# Patient Record
Sex: MALE | Race: WHITE | NOT HISPANIC OR LATINO | URBAN - METROPOLITAN AREA
[De-identification: names, ages, dates, MRNs, and addresses within clinical notes are randomized per-mention and may not be internally consistent; named-entity substitution may affect disease eponyms.]

---

## 2017-06-14 ENCOUNTER — EMERGENCY (EMERGENCY)
Facility: HOSPITAL | Age: 62
LOS: 1 days | Discharge: HOME | End: 2017-06-14
Attending: EMERGENCY MEDICINE | Admitting: EMERGENCY MEDICINE
Payer: COMMERCIAL

## 2017-06-14 VITALS
RESPIRATION RATE: 15 BRPM | HEART RATE: 91 BPM | SYSTOLIC BLOOD PRESSURE: 152 MMHG | TEMPERATURE: 98 F | OXYGEN SATURATION: 100 % | DIASTOLIC BLOOD PRESSURE: 79 MMHG

## 2017-06-14 VITALS
HEART RATE: 94 BPM | SYSTOLIC BLOOD PRESSURE: 90 MMHG | OXYGEN SATURATION: 97 % | HEIGHT: 71 IN | DIASTOLIC BLOOD PRESSURE: 50 MMHG | TEMPERATURE: 98 F | RESPIRATION RATE: 18 BRPM | WEIGHT: 166.89 LBS
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
## 2017-06-14 DIAGNOSIS — S41.112A LACERATION WITHOUT FOREIGN BODY OF LEFT UPPER ARM, INITIAL ENCOUNTER: ICD-10-CM

## 2017-06-14 DIAGNOSIS — I10 ESSENTIAL (PRIMARY) HYPERTENSION: ICD-10-CM
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
## 2017-06-14 PROCEDURE — 96365 THER/PROPH/DIAG IV INF INIT: CPT | Mod: 59

## 2017-06-14 PROCEDURE — 13122 CMPLX RPR S/A/L ADDL 5 CM/>: CPT | Mod: LT

## 2017-06-14 PROCEDURE — 99285 EMERGENCY DEPT VISIT HI MDM: CPT | Mod: 25

## 2017-06-14 PROCEDURE — 13121 CMPLX RPR S/A/L 2.6-7.5 CM: CPT | Mod: LT

## 2017-06-14 PROCEDURE — 73060 X-RAY EXAM OF HUMERUS: CPT

## 2017-06-14 PROCEDURE — 73060 X-RAY EXAM OF HUMERUS: CPT | Mod: 26,LT

## 2017-06-14 PROCEDURE — 99284 EMERGENCY DEPT VISIT MOD MDM: CPT | Mod: 25

## 2017-06-14 PROCEDURE — 13121 CMPLX RPR S/A/L 2.6-7.5 CM: CPT

## 2017-06-14 PROCEDURE — 13122 CMPLX RPR S/A/L ADDL 5 CM/>: CPT

## 2017-06-14 RX ORDER — AMPICILLIN SODIUM AND SULBACTAM SODIUM 250; 125 MG/ML; MG/ML
3 INJECTION, POWDER, FOR SUSPENSION INTRAMUSCULAR; INTRAVENOUS ONCE
Qty: 0 | Refills: 0 | Status: COMPLETED | OUTPATIENT
Start: 2017-06-14 | End: 2017-06-14

## 2017-06-14 RX ORDER — SODIUM CHLORIDE 9 MG/ML
1000 INJECTION INTRAMUSCULAR; INTRAVENOUS; SUBCUTANEOUS ONCE
Qty: 0 | Refills: 0 | Status: COMPLETED | OUTPATIENT
Start: 2017-06-14 | End: 2017-06-14

## 2017-06-14 RX ADMIN — SODIUM CHLORIDE 1000 MILLILITER(S): 9 INJECTION INTRAMUSCULAR; INTRAVENOUS; SUBCUTANEOUS at 19:18

## 2017-06-14 RX ADMIN — AMPICILLIN SODIUM AND SULBACTAM SODIUM 200 GRAM(S): 250; 125 INJECTION, POWDER, FOR SUSPENSION INTRAMUSCULAR; INTRAVENOUS at 19:17

## 2017-06-14 NOTE — ED ADULT NURSE REASSESSMENT NOTE - NS ED NURSE REASSESS COMMENT FT1
Received patient from Myrtle CHAVEZ. Patient alert and oriented. Patient complaining of moderate pain left arm from laceration. Patient vital signs as documented. Awaiting plastics consult for laceration repair. Patient and family aware of plan. Safety maintained.

## 2017-06-14 NOTE — ED PROVIDER NOTE - OBJECTIVE STATEMENT
pt bib ems for left arm laceration s/p missed bottom step when climbing down ladder, stumbled back through large glass window. no loc, ha, neck or back pain, weakness, numbness, cp, sob, abd pain. tetanus utd  pmd - andrea

## 2017-06-14 NOTE — CONSULT NOTE ADULT - SUBJECTIVE AND OBJECTIVE BOX
see dictated note.  consented.  tolerated repair of left arm lac.  augmentin/percocet for 10 days.  keep splinted, c/d/e.  f/u next wk.

## 2017-06-14 NOTE — ED PROVIDER NOTE - PROGRESS NOTE DETAILS
pt seen eval sutured and splinted by dr ramires (plastics), he requests d/c with augmentin and percocet and f/u as outpt

## 2017-06-14 NOTE — ED PROVIDER NOTE - SKIN, MLM
Skin normal color for race, warm, dry. approx cm laceration to left upper arm with exposed muscle, no active bleeding Skin normal color for race, warm, dry. approx 8 cm laceration to left upper arm with exposed muscle, no active bleeding Skin normal color for race, warm, dry. approx 8 cm laceration to left upper arm with exposed muscle, no active bleeding. after wound explored, tricep muscle laceration visualized Skin normal color for race, warm, dry. approx 10 cm laceration to left upper arm with exposed muscle, no active bleeding. after wound explored, tricep muscle laceration visualized

## 2017-06-14 NOTE — ED ADULT NURSE NOTE - OBJECTIVE STATEMENT
Pt to ED s/p fall onto glass resulting in laceration to left anti-cubital area, EMS placed tourniquet/ace to minimize bleeding Pt to ED s/p fall onto glass resulting in laceration to left anti-cubital area, EMS placed tourniquet/ace to minimize bleeding, laceration approx 9cm in length, cut to the muscle layer with moderate bleeding, pt had recent tetanus shot

## 2023-03-18 ENCOUNTER — OFFICE (OUTPATIENT)
Dept: URBAN - METROPOLITAN AREA CLINIC 109 | Facility: CLINIC | Age: 68
Setting detail: OPHTHALMOLOGY
End: 2023-03-18
Payer: COMMERCIAL

## 2023-03-18 DIAGNOSIS — H25.13: ICD-10-CM

## 2023-03-18 PROCEDURE — 99213 OFFICE O/P EST LOW 20 MIN: CPT | Performed by: OPHTHALMOLOGY

## 2023-03-18 ASSESSMENT — SPHEQUIV_DERIVED
OD_SPHEQUIV: -0.25
OS_SPHEQUIV: -0.375

## 2023-03-18 ASSESSMENT — REFRACTION_CURRENTRX
OS_OVR_VA: 20/
OD_CYLINDER: -1.00
OD_SPHERE: +0.25
OD_OVR_VA: 20/
OS_CYLINDER: -1.00
OD_AXIS: 100
OS_SPHERE: PLANO
OS_AXIS: 74

## 2023-03-18 ASSESSMENT — CONFRONTATIONAL VISUAL FIELD TEST (CVF)
OS_FINDINGS: FULL
OD_FINDINGS: FULL

## 2023-03-18 ASSESSMENT — REFRACTION_AUTOREFRACTION
OD_CYLINDER: -1.00
OS_AXIS: 73
OS_CYLINDER: -1.25
OD_AXIS: 100
OD_SPHERE: +0.25
OS_SPHERE: +0.25

## 2023-03-18 ASSESSMENT — REFRACTION_MANIFEST
OS_AXIS: 82
OD_AXIS: 100
OS_SPHERE: PLANO
OS_ADD: +2.25
OD_CYLINDER: -1.00
OD_ADD: +2.25
OS_CYLINDER: -1.00
OD_SPHERE: PLANO

## 2023-03-18 ASSESSMENT — VISUAL ACUITY
OS_BCVA: 20/25-2
OD_BCVA: 20/20-1

## 2023-03-18 ASSESSMENT — TONOMETRY
OS_IOP_MMHG: 17
OD_IOP_MMHG: 17

## 2024-12-24 ENCOUNTER — OFFICE (OUTPATIENT)
Dept: URBAN - METROPOLITAN AREA CLINIC 109 | Facility: CLINIC | Age: 69
Setting detail: OPHTHALMOLOGY
End: 2024-12-24
Payer: MEDICARE

## 2024-12-24 DIAGNOSIS — H02.132: ICD-10-CM

## 2024-12-24 DIAGNOSIS — H25.13: ICD-10-CM

## 2024-12-24 DIAGNOSIS — H52.4: ICD-10-CM

## 2024-12-24 PROCEDURE — 92015 DETERMINE REFRACTIVE STATE: CPT | Performed by: OPHTHALMOLOGY

## 2024-12-24 PROCEDURE — 92014 COMPRE OPH EXAM EST PT 1/>: CPT | Performed by: OPHTHALMOLOGY

## 2024-12-24 ASSESSMENT — CONFRONTATIONAL VISUAL FIELD TEST (CVF)
OS_FINDINGS: FULL
OD_FINDINGS: FULL

## 2024-12-24 ASSESSMENT — REFRACTION_AUTOREFRACTION
OS_SPHERE: +0.50
OD_CYLINDER: -1.50
OS_CYLINDER: -1.25
OD_SPHERE: +0.25
OS_AXIS: 089
OD_AXIS: 104

## 2024-12-24 ASSESSMENT — REFRACTION_MANIFEST
OS_AXIS: 090
OD_CYLINDER: -1.50
OD_VA1: 20/30
OS_SPHERE: +0.50
OS_VA1: 20/20
OD_SPHERE: +0.50
OS_CYLINDER: -1.25
OS_ADD: +2.25
OD_ADD: +2.25
OD_AXIS: 104

## 2024-12-24 ASSESSMENT — REFRACTION_CURRENTRX
OD_CYLINDER: -1.00
OD_OVR_VA: 20/
OS_OVR_VA: 20/
OD_AXIS: 100
OS_SPHERE: PLANO
OS_AXIS: 74
OD_SPHERE: +0.25
OS_CYLINDER: -1.00

## 2024-12-24 ASSESSMENT — LID POSITION - ECTROPION: OD_ECTROPION: RLL

## 2024-12-24 ASSESSMENT — TONOMETRY
OS_IOP_MMHG: 17
OD_IOP_MMHG: 14

## 2024-12-24 ASSESSMENT — VISUAL ACUITY
OD_BCVA: 20/25-1
OS_BCVA: 20/40-2